# Patient Record
Sex: MALE | Race: WHITE | NOT HISPANIC OR LATINO | Employment: OTHER | URBAN - METROPOLITAN AREA
[De-identification: names, ages, dates, MRNs, and addresses within clinical notes are randomized per-mention and may not be internally consistent; named-entity substitution may affect disease eponyms.]

---

## 2018-11-07 PROBLEM — R10.13 EPIGASTRIC PAIN: Status: ACTIVE | Noted: 2018-11-07

## 2019-02-17 PROBLEM — R11.2 NAUSEA & VOMITING: Status: ACTIVE | Noted: 2019-02-17

## 2019-02-17 PROBLEM — K52.9 COLITIS: Status: ACTIVE | Noted: 2019-02-17

## 2019-02-17 PROBLEM — K92.0 HEMATEMESIS: Status: ACTIVE | Noted: 2019-02-17

## 2019-02-17 PROBLEM — I10 PRIMARY HYPERTENSION: Status: ACTIVE | Noted: 2019-02-17

## 2019-02-17 PROBLEM — E87.8 ELECTROLYTE ABNORMALITY: Status: ACTIVE | Noted: 2019-02-17

## 2019-02-17 PROBLEM — Z79.4 TYPE 2 DIABETES MELLITUS WITH KIDNEY COMPLICATION, WITH LONG-TERM CURRENT USE OF INSULIN: Status: ACTIVE | Noted: 2019-02-17

## 2019-02-17 PROBLEM — E78.5 DYSLIPIDEMIA: Status: ACTIVE | Noted: 2019-02-17

## 2019-02-17 PROBLEM — E11.29 TYPE 2 DIABETES MELLITUS WITH KIDNEY COMPLICATION, WITH LONG-TERM CURRENT USE OF INSULIN: Status: ACTIVE | Noted: 2019-02-17

## 2019-02-17 PROBLEM — N18.6 ESRD (END STAGE RENAL DISEASE): Status: ACTIVE | Noted: 2019-02-17

## 2019-02-19 PROBLEM — D69.6 THROMBOCYTOPENIA: Status: ACTIVE | Noted: 2019-02-19

## 2022-11-30 PROBLEM — R79.89 ELEVATED TROPONIN: Status: ACTIVE | Noted: 2022-11-30

## 2022-11-30 PROBLEM — K76.9 CHRONIC LIVER DISEASE: Chronic | Status: ACTIVE | Noted: 2022-11-30

## 2022-11-30 PROBLEM — K63.5 COLON POLYPS: Status: ACTIVE | Noted: 2022-11-30

## 2022-11-30 PROBLEM — R00.0 SINUS TACHYCARDIA: Status: ACTIVE | Noted: 2022-11-30

## 2022-11-30 PROBLEM — D50.9 IRON DEFICIENCY ANEMIA: Status: ACTIVE | Noted: 2022-11-30

## 2022-11-30 PROBLEM — D62 ACUTE BLOOD LOSS ANEMIA: Status: ACTIVE | Noted: 2022-11-30

## 2022-11-30 PROBLEM — Z71.89 ADVANCED CARE PLANNING/COUNSELING DISCUSSION: Status: ACTIVE | Noted: 2022-11-30
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
## 2023-11-04 PROBLEM — S06.6X0A SUBARACHNOID HEMORRHAGE FOLLOWING INJURY, NO LOSS OF CONSCIOUSNESS: Status: ACTIVE | Noted: 2023-11-04

## 2023-11-05 PROBLEM — I25.10 CORONARY ARTERY DISEASE INVOLVING NATIVE CORONARY ARTERY OF NATIVE HEART: Status: ACTIVE | Noted: 2023-11-05

## 2023-11-05 PROBLEM — Z79.891 CHRONIC PRESCRIPTION OPIATE USE: Chronic | Status: ACTIVE | Noted: 2023-11-05

## 2023-11-05 PROBLEM — K21.9 GASTROESOPHAGEAL REFLUX DISEASE WITHOUT ESOPHAGITIS: Status: ACTIVE | Noted: 2023-11-05

## 2023-11-05 PROBLEM — G62.9 NEUROPATHY: Chronic | Status: ACTIVE | Noted: 2023-11-05

## 2023-11-05 PROBLEM — S06.30AA TRAUMATIC INTRAVENTRICULAR HEMORRHAGE: Status: ACTIVE | Noted: 2023-11-05

## 2023-11-05 PROBLEM — J38.3 VOCAL CORD DYSFUNCTION: Status: ACTIVE | Noted: 2023-11-05

## 2023-11-05 PROBLEM — S06.30AA TRAUMATIC INTRAVENTRICULAR HEMORRHAGE: Status: RESOLVED | Noted: 2023-11-05 | Resolved: 2023-11-05

## 2023-11-06 PROBLEM — E87.8 ELECTROLYTE DISTURBANCE: Status: RESOLVED | Noted: 2019-02-17 | Resolved: 2023-11-06

## 2023-11-07 ENCOUNTER — TELEPHONE (OUTPATIENT)
Dept: NEUROSURGERY | Facility: CLINIC | Age: 63
End: 2023-11-07
Payer: MEDICARE

## 2023-11-07 DIAGNOSIS — I62.00 NONTRAUMATIC SUBDURAL HEMORRHAGE, UNSPECIFIED: Primary | ICD-10-CM

## 2023-11-07 PROBLEM — M89.8X9 RADIOLUCENT LESION OF BONE: Status: ACTIVE | Noted: 2023-11-07

## 2023-11-07 PROBLEM — E78.5 DYSLIPIDEMIA: Status: RESOLVED | Noted: 2019-02-17 | Resolved: 2023-11-07

## 2023-11-07 NOTE — TELEPHONE ENCOUNTER
----- Message from Nika Metcalf NP sent at 11/7/2023 12:47 PM CST -----  Regarding: Who does Vanessa follow ups?  Dr. EDWARDS would like 1 week follow up with head CT for this harsha who fell and had a head bleed.   Thank you

## 2023-12-08 PROBLEM — N18.5 ANEMIA, CHRONIC RENAL FAILURE, STAGE 5: Status: ACTIVE | Noted: 2023-12-08

## 2023-12-08 PROBLEM — D63.1 ANEMIA, CHRONIC RENAL FAILURE, STAGE 5: Status: ACTIVE | Noted: 2023-12-08

## 2023-12-08 PROBLEM — J81.1 PULMONARY EDEMA: Status: ACTIVE | Noted: 2023-12-08

## 2023-12-09 PROBLEM — F41.1 GENERALIZED ANXIETY DISORDER: Status: ACTIVE | Noted: 2023-12-09

## 2023-12-10 PROBLEM — J81.0 ACUTE PULMONARY EDEMA: Status: ACTIVE | Noted: 2023-12-08

## 2023-12-10 PROBLEM — J96.11 CHRONIC RESPIRATORY FAILURE WITH HYPOXIA: Status: ACTIVE | Noted: 2023-12-10

## 2023-12-19 ENCOUNTER — OFFICE VISIT (OUTPATIENT)
Dept: NEUROSURGERY | Facility: CLINIC | Age: 63
End: 2023-12-19
Payer: MEDICARE

## 2023-12-19 VITALS
SYSTOLIC BLOOD PRESSURE: 169 MMHG | RESPIRATION RATE: 18 BRPM | WEIGHT: 200 LBS | HEART RATE: 68 BPM | DIASTOLIC BLOOD PRESSURE: 72 MMHG | BODY MASS INDEX: 28.63 KG/M2 | HEIGHT: 70 IN

## 2023-12-19 DIAGNOSIS — I62.00 NONTRAUMATIC SUBDURAL HEMORRHAGE, UNSPECIFIED: Primary | ICD-10-CM

## 2023-12-19 PROCEDURE — 99214 OFFICE O/P EST MOD 30 MIN: CPT | Mod: S$PBB,,, | Performed by: NEUROLOGICAL SURGERY

## 2023-12-19 PROCEDURE — 99214 PR OFFICE/OUTPT VISIT, EST, LEVL IV, 30-39 MIN: ICD-10-PCS | Mod: S$PBB,,, | Performed by: NEUROLOGICAL SURGERY

## 2023-12-19 NOTE — PROGRESS NOTES
Neurosurgery History and Physical    Patient ID: Abhishek Parker is a 63 y.o. male.    Chief Complaint   Patient presents with    Follow-up     Patient present to clinic today as image review        Patient is a 63 year old male who presents today for a hospital follow up from 11/5/23 regarding his intraventricular hemorrhage and left frontal cortical contusion.  He has been staying in a nursing home in [place]. Prior to his fall, he lived with his brother. He reports the left foot is weak and feels like it is getting worse. He is having pain in both of his feet and entire legs. He does report some back pain as well. He has tingling in the feet ongoing for about 10 years with diabetic neuropathy. He does walk with a walker at the nursing home.     He denies headaches. He has no bladder incontinence, but doesn't produce much urine due to his ESRD. He has dialysis three times per week.     Review of Systems   Musculoskeletal:  Positive for back pain and gait problem.   Neurological:  Positive for weakness and numbness. Negative for headaches.   All other systems reviewed and are negative.      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    LEFT SIDE     Social History     Socioeconomic History    Marital status: Single   Tobacco Use    Smoking status: Former     Current packs/day: 0.00     Types: Cigarettes     Quit date: 9/1/2008     Years since quitting: 15.3    Smokeless tobacco: Never   Substance and Sexual Activity    Alcohol use: No    Drug use: No     Social Determinants of Health     Financial Resource Strain: Low Risk  (12/11/2023)    Overall Financial Resource Strain (CARDIA)     Difficulty of Paying Living Expenses: Not hard at all   Food Insecurity: No Food Insecurity  (12/11/2023)    Hunger Vital Sign     Worried About Running Out of Food in the Last Year: Never true     Ran Out of Food in the Last Year: Never true   Transportation Needs: No Transportation Needs (12/11/2023)    PRAPARE - Transportation     Lack of Transportation (Medical): No     Lack of Transportation (Non-Medical): No   Physical Activity: Unknown (11/6/2023)    Exercise Vital Sign     Days of Exercise per Week: 0 days   Housing Stability: Low Risk  (12/11/2023)    Housing Stability Vital Sign     Unable to Pay for Housing in the Last Year: No     Number of Places Lived in the Last Year: 1     Unstable Housing in the Last Year: No     Family History   Problem Relation Age of Onset    Lupus Father     Cancer Sister     Cancer Sister      Review of patient's allergies indicates:   Allergen Reactions    Neurontin [gabapentin] Other (See Comments)       Current Outpatient Medications:     allopurinol (ZYLOPRIM) 100 MG tablet, Take 100 mg by mouth once daily., Disp: , Rfl:     amLODIPine (NORVASC) 10 MG tablet, Take 10 mg by mouth once daily., Disp: , Rfl:     aspirin 81 MG Chew, Take 1 tablet (81 mg total) by mouth once daily., Disp: 30 tablet, Rfl: 0    blood sugar diagnostic Strp, To check BG weekly, to use with insurance preferred meter. Please provide a meter and supplies that patient's insurance will cover. Thank You.  E11.65 (Patient taking differently: 1 strip by In Vitro route 4 (four) times daily before meals and nightly.), Disp: 100 strip, Rfl: 1    clonazePAM (KLONOPIN) 0.5 MG tablet, Take 1 tablet (0.5 mg total) by mouth 2 (two) times daily., Disp: 8 tablet, Rfl: 0    clopidogreL (PLAVIX) 75 mg tablet, Take 1 tablet (75 mg total) by mouth once daily., Disp: 30 tablet, Rfl: 11    diazePAM (VALIUM) 5 MG tablet, Take 1 tablet (5 mg total) by mouth daily as needed for Anxiety (PANIC ATTACK AND 30 MIN PRIOR TO DIALYSIS)., Disp: 4 tablet, Rfl: 0    folic acid/vit B complex and C (AUSTYN-ULICES ORAL), Take 1 tablet  by mouth once daily., Disp: , Rfl:     hydrALAZINE (APRESOLINE) 25 MG tablet, Take 25 mg by mouth once daily., Disp: , Rfl:     HYDROcodone-acetaminophen (NORCO)  mg per tablet, Take 1 tablet by mouth every 6 (six) hours as needed for Pain., Disp: 12 tablet, Rfl: 0    hydrOXYzine pamoate (VISTARIL) 25 MG Cap, Take 25 mg by mouth every 6 (six) hours as needed (anxiety)., Disp: , Rfl:     insulin (LANTUS SOLOSTAR U-100 INSULIN) glargine 100 units/mL SubQ pen, Inject 10 Units into the skin daily as needed (for blood glucose >170)., Disp: , Rfl:     lancets Misc, To check BG weekly, to use with insurance preferred meter. Please provide a meter and supplies that patient's insurance will cover. Thank You.  E11.65 (Patient taking differently: 1 Lancet by Subdermal route 4 (four) times daily with meals and nightly.), Disp: 100 each, Rfl: 1    levothyroxine (SYNTHROID) 100 MCG tablet, Take 100 mcg by mouth before breakfast., Disp: , Rfl:     losartan (COZAAR) 100 MG tablet, Take 1 tablet (100 mg total) by mouth once daily., Disp: 90 tablet, Rfl: 3    metoprolol succinate (TOPROL-XL) 50 MG 24 hr tablet, Take 50 mg by mouth once daily., Disp: , Rfl:     pantoprazole (PROTONIX) 40 MG tablet, Take 1 tablet (40 mg total) by mouth 2 (two) times daily., Disp: 60 tablet, Rfl: 0    rosuvastatin (CRESTOR) 10 MG tablet, Take 10 mg by mouth once daily., Disp: , Rfl:     sertraline (ZOLOFT) 25 MG tablet, Take 1 tablet (25 mg total) by mouth once daily., Disp: 30 tablet, Rfl: 1    sevelamer carbonate (RENVELA) 800 mg Tab, Take 800 mg by mouth 3 (three) times daily with meals., Disp: , Rfl:     trazodone (DESYREL) 25 MG tablet, Take 25 mg by mouth every evening., Disp: , Rfl:     UNABLE TO FIND, Take 1 Cartridge by mouth once daily. medication name: nephro - said 1 carton daily to end stage renal disease, Disp: , Rfl:     blood-glucose meter kit, To check BG weekly, to use with insurance preferred meter. Please provide a meter and  "supplies that patient's insurance will cover. Thank You.  E11.65, Disp: 1 each, Rfl: 1  No current facility-administered medications for this visit.    Facility-Administered Medications Ordered in Other Visits:     0.9%  NaCl infusion, , Intravenous, Continuous, Nitish Mcmillan MD    aspirin tablet 325 mg, 325 mg, Oral, On Call Procedure, Owen Santiago MD, 325 mg at 08/18/23 1002    ondansetron injection 4 mg, 4 mg, Intravenous, Daily PRN, Volpi-Abadie, Jacqueline, MD    sodium chloride 0.9% flush 3 mL, 3 mL, Intravenous, PRN, Volpi-Abadie, Jacqueline, MD  Blood pressure (!) 169/72, pulse 68, resp. rate 18, height 5' 10" (1.778 m), weight 90.7 kg (200 lb).      Neurologic Exam     Mental Status   Oriented to person, place, and time.   Speech: speech is normal   Level of consciousness: alert  Knowledge: good.     Cranial Nerves     CN III, IV, VI   Extraocular motions are normal.     CN VII   Facial expression full, symmetric.     CN VIII   Hearing: intact    Motor Exam   Right arm pronator drift: absent  Left arm pronator drift: absent    Strength   Right deltoid: 5/5  Left deltoid: 5/5  Right biceps: 5/5  Left biceps: 5/5  Right triceps: 5/5  Left triceps: 5/5  Right wrist flexion: 5/5  Left wrist flexion: 5/5  Right wrist extension: 5/5  Left wrist extension: 5/5  Right interossei: 5/5  Left interossei: 5/5  Right iliopsoas: 5/5  Left iliopsoas: 4/5  Right quadriceps: 5/5  Left quadriceps: 5/5  Right anterior tibial: 5/5  Right posterior tibial: 5/5  Right peroneal: 5/5  Right gastroc: 5/5  Left dorsiflexion: 3/5  Left plantarflexion: 5/5  Left EHL: 0/5     Sensory Exam   Right arm light touch: normal  Left arm light touch: normal  Right leg light touch: decreased from ankle  Left leg light touch: decreased from ankle    Gait, Coordination, and Reflexes     Coordination   Finger to nose coordination: normal    Reflexes   Right biceps: 2+  Left biceps: 2+  Right patellar: 0  Left patellar: 0  Right " "achilles: 0  Left achilles: 0  Right Goyal: absent  Left Goyal: absent  Presents in wheelchair        Physical Exam  Vitals and nursing note reviewed.   Eyes:      Extraocular Movements: EOM normal.   Neurological:      Mental Status: He is oriented to person, place, and time.      Coordination: Finger-Nose-Finger Test normal.      Deep Tendon Reflexes:      Reflex Scores:       Bicep reflexes are 2+ on the right side and 2+ on the left side.       Patellar reflexes are 0 on the right side and 0 on the left side.       Achilles reflexes are 0 on the right side and 0 on the left side.  Psychiatric:         Speech: Speech normal.         Vital Signs  Pulse: 68  Resp: 18  BP: (!) 169/72  BP Location: Right arm  Patient Position: Sitting  Pain Score:   6  Pain Loc: Leg  Height and Weight  Height: 5' 10" (177.8 cm)  Weight: 90.7 kg (200 lb)  BSA (Calculated - sq m): 2.12 sq meters  BMI (Calculated): 28.7  Weight in (lb) to have BMI = 25: 173.9]    Provider dictation:  CT head dated 12/8/23 reveals no acute intracranial abnormalities and no new bleeding.     He is cleared to continue his Aspirin as needed for his cardiac stents from a neurosurgery standpoint. Continue with PT at the facility. We do not need to repeat any scans and he may follow up prn.     Visit Diagnosis:  Nontraumatic subdural hemorrhage, unspecified        "

## 2024-02-13 PROBLEM — E03.9 HYPOTHYROID: Status: ACTIVE | Noted: 2024-02-13

## 2024-02-13 PROBLEM — E87.6 HYPOKALEMIA: Status: ACTIVE | Noted: 2024-02-13

## 2024-02-13 PROBLEM — D63.8 ANEMIA OF CHRONIC DISEASE: Status: ACTIVE | Noted: 2024-02-13

## 2024-02-13 PROBLEM — S06.350A TRAUMATIC LEFT-SIDED INTRACEREBRAL HEMORRHAGE WITHOUT LOSS OF CONSCIOUSNESS: Status: ACTIVE | Noted: 2024-02-13

## 2024-02-14 ENCOUNTER — TELEPHONE (OUTPATIENT)
Dept: NEUROSURGERY | Facility: CLINIC | Age: 64
End: 2024-02-14
Payer: MEDICARE

## 2024-02-14 DIAGNOSIS — I62.00 NONTRAUMATIC SUBDURAL HEMORRHAGE, UNSPECIFIED: Primary | ICD-10-CM

## 2024-02-14 PROBLEM — R78.81 BACTEREMIA: Status: ACTIVE | Noted: 2024-02-14

## 2024-02-14 PROBLEM — R29.6 FREQUENT FALLS: Chronic | Status: ACTIVE | Noted: 2024-02-14

## 2024-02-14 NOTE — TELEPHONE ENCOUNTER
----- Message from Nika Metcalf NP sent at 2/14/2024 12:40 PM CST -----  Regarding: follow up per Dr. Gallegos  Patient known to Dr. Gallegos, would like for him to follow up with repeat head CT in 2 weeks with [facility] for routine follow up for Small trace SA[facility].   Thanks-E

## 2024-02-15 PROBLEM — I33.0 ACUTE BACTERIAL ENDOCARDITIS: Status: ACTIVE | Noted: 2024-02-15

## 2024-02-18 PROBLEM — K74.60 CIRRHOSIS: Status: ACTIVE | Noted: 2022-11-30

## 2024-02-19 PROBLEM — Z99.2 ESRD ON HEMODIALYSIS: Status: ACTIVE | Noted: 2019-02-17

## 2024-02-20 ENCOUNTER — TELEPHONE (OUTPATIENT)
Dept: INFECTIOUS DISEASES | Facility: CLINIC | Age: 64
End: 2024-02-20
Payer: MEDICARE

## 2024-02-20 NOTE — TELEPHONE ENCOUNTER
----- Message from Liza Santos sent at 2/20/2024  3:17 PM CST -----  Type:  Sooner Appointment Request    Caller is requesting a sooner appointment.  Caller declined first available appointment listed below.  Caller will not accept being placed on the waitlist and is requesting a message be sent to doctor.    Name of Caller:  Danya with [facility]  When is the first available appointment?  na  Symptoms:  hospital f/u- 1 week  Would the patient rather a call back or a response via [facility]? call  Best Call Back Number:  [phone] (home) [phone]    Additional Information:

## 2024-02-20 NOTE — TELEPHONE ENCOUNTER
Returned call, patient is currently being discharged, nurse was in the room giving d/c instructions, spoke with nurse, ID f/u date/time/location is on the d/c printout and hilighted for patient.

## 2024-02-29 ENCOUNTER — TELEPHONE (OUTPATIENT)
Dept: NEUROSURGERY | Facility: CLINIC | Age: 64
End: 2024-02-29
Payer: MEDICARE

## 2024-02-29 NOTE — TELEPHONE ENCOUNTER
----- Message from Henry Mendes sent at 2/29/2024 10:31 AM CST -----  Type: Needs Medical Advice  Who Called:  Se Arellano Call Back Number: [phone]  Additional Information: pt has an appt tomorrow that's needs to be r/s, pl call bk to advise thank

## 2024-03-01 ENCOUNTER — OFFICE VISIT (OUTPATIENT)
Dept: NEUROSURGERY | Facility: CLINIC | Age: 64
End: 2024-03-01
Payer: MEDICARE

## 2024-03-01 ENCOUNTER — HOSPITAL ENCOUNTER (OUTPATIENT)
Dept: RADIOLOGY | Facility: HOSPITAL | Age: 64
Discharge: HOME | End: 2024-03-01
Attending: NEUROLOGICAL SURGERY
Payer: MEDICARE

## 2024-03-01 VITALS
SYSTOLIC BLOOD PRESSURE: 132 MMHG | HEIGHT: 70 IN | DIASTOLIC BLOOD PRESSURE: 61 MMHG | BODY MASS INDEX: 26.92 KG/M2 | RESPIRATION RATE: 18 BRPM | WEIGHT: 188.06 LBS | HEART RATE: 71 BPM

## 2024-03-01 DIAGNOSIS — S06.6X0D SUBARACHNOID HEMORRHAGE FOLLOWING INJURY, NO LOSS OF CONSCIOUSNESS, SUBSEQUENT ENCOUNTER: Primary | ICD-10-CM

## 2024-03-01 DIAGNOSIS — I62.00 NONTRAUMATIC SUBDURAL HEMORRHAGE, UNSPECIFIED: ICD-10-CM

## 2024-03-01 PROCEDURE — 70450 CT HEAD/BRAIN W/O DYE: CPT | Mod: TC,PO

## 2024-03-01 PROCEDURE — 70450 CT HEAD/BRAIN W/O DYE: CPT | Mod: 26,,, | Performed by: RADIOLOGY

## 2024-03-01 PROCEDURE — 99213 OFFICE O/P EST LOW 20 MIN: CPT | Mod: S$PBB,,, | Performed by: PHYSICIAN ASSISTANT

## 2024-03-01 NOTE — PROGRESS NOTES
Neurosurgery History and Physical    Patient ID: Abhishek Parker is a 63 y.o. male.    Chief Complaint   Patient presents with    Follow-up     2 week f/u with imaging       HPI 3/1/24:  Patient is a 63 year old male with complex medical history who presents as a hospital follow up for his left para falcine subarachnoid hemorrhage. He feels back to normal. He did have another fall on Saturday, 6 days ago. He was leaning over to plug in a heater and fell backwards striking the right side of his head on the bed or the nightstand. He was found down by his brother. Patient refused medical care at that time. He did complete his CT scan today. He has been holding his ASA and Plavix, has multiple coronary stents. He denies headache, vision changes, new weakness, numbness, confusion or any other concerning symptoms.     Review of Systems   Eyes:  Negative for photophobia and visual disturbance.   Musculoskeletal:  Positive for back pain and gait problem.   Neurological:  Negative for dizziness, tremors, syncope, facial asymmetry, speech difficulty, light-headedness and headaches.   Psychiatric/Behavioral:  Negative for confusion.    All other systems reviewed and are negative.      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    LEFT SIDE     Social History     Socioeconomic History    Marital status: Single   Tobacco Use    Smoking status: Former     Current packs/day: 0.00     Types: Cigarettes     Quit date: 9/1/2008     Years since quitting: 15.5    Smokeless tobacco: Never   Substance and Sexual Activity    Alcohol use: No    Drug use: No     Social Determinants of Health     Financial Resource Strain: Low Risk  (2/14/2024)    Overall Financial Resource Strain (CARDIA)     Difficulty of Paying  Living Expenses: Not hard at all   Food Insecurity: No Food Insecurity (2/14/2024)    Hunger Vital Sign     Worried About Running Out of Food in the Last Year: Never true     Ran Out of Food in the Last Year: Never true   Transportation Needs: No Transportation Needs (2/14/2024)    PRAPARE - Transportation     Lack of Transportation (Medical): No     Lack of Transportation (Non-Medical): No   Physical Activity: Unknown (11/6/2023)    Exercise Vital Sign     Days of Exercise per Week: 0 days   Housing Stability: Low Risk  (2/14/2024)    Housing Stability Vital Sign     Unable to Pay for Housing in the Last Year: No     Number of Places Lived in the Last Year: 1     Unstable Housing in the Last Year: No     Family History   Problem Relation Age of Onset    Lupus Father     Cancer Sister     Cancer Sister      Review of patient's allergies indicates:   Allergen Reactions    Hydrocodone     Neurontin [gabapentin] Other (See Comments)       Current Outpatient Medications:     allopurinol (ZYLOPRIM) 100 MG tablet, Take 100 mg by mouth after lunch., Disp: , Rfl:     amLODIPine (NORVASC) 10 MG tablet, Take 10 mg by mouth after lunch., Disp: , Rfl:     benzonatate (TESSALON) 100 MG capsule, Take 1 capsule (100 mg total) by mouth 3 (three) times daily as needed for Cough., Disp: 30 capsule, Rfl: 0    blood sugar diagnostic Strp, To check BG weekly, to use with insurance preferred meter. Please provide a meter and supplies that patient's insurance will cover. Thank You.  E11.65 (Patient taking differently: 1 strip by In Vitro route 2 (two) times a day.), Disp: 100 strip, Rfl: 1    calcitRIOL (ROCALTROL) 0.25 MCG Cap, Take 1 capsule (0.25 mcg total) by mouth 3 (three) times a week., Disp: 12 capsule, Rfl: 0    clonazePAM (KLONOPIN) 0.5 MG tablet, Take 0.5 mg by mouth nightly as needed for Anxiety., Disp: , Rfl:     clonazePAM (KLONOPIN) 0.5 MG tablet, Take 1 tablet (0.5 mg total) by mouth once daily., Disp: , Rfl:     dextrose  5 % (D5W) SolP 100 mL with ceftAZIDime 1 gram SolR 1 g, Inject 1 g into the vein every Mon, Wed, Fri. After Hemodialysis every MWF, Disp: , Rfl:     diazePAM (VALIUM) 5 MG tablet, Take 1 tablet (5 mg total) by mouth daily as needed for Anxiety (PANIC ATTACK AND 30 MIN PRIOR TO DIALYSIS)., Disp: 4 tablet, Rfl: 0    hydrALAZINE (APRESOLINE) 25 MG tablet, Take 25 mg by mouth 2 (two) times a day., Disp: , Rfl:     hydrOXYzine pamoate (VISTARIL) 25 MG Cap, Take 25 mg by mouth every 6 (six) hours as needed (anxiety)., Disp: , Rfl:     insulin (LANTUS SOLOSTAR U-100 INSULIN) glargine 100 units/mL SubQ pen, Inject 10 Units into the skin daily as needed (for blood glucose >170)., Disp: , Rfl:     lancets Misc, To check BG weekly, to use with insurance preferred meter. Please provide a meter and supplies that patient's insurance will cover. Thank You.  E11.65 (Patient taking differently: 1 Lancet by Subdermal route 2 (two) times a day.), Disp: 100 each, Rfl: 1    levothyroxine (SYNTHROID) 100 MCG tablet, Take 100 mcg by mouth with lunch., Disp: , Rfl:     metoprolol succinate (TOPROL-XL) 50 MG 24 hr tablet, Take 50 mg by mouth after lunch., Disp: , Rfl:     pantoprazole (PROTONIX) 40 MG tablet, Take 1 tablet (40 mg total) by mouth 2 (two) times daily., Disp: 60 tablet, Rfl: 0    pregabalin (LYRICA) 100 MG capsule, Take 100 mg by mouth 2 (two) times daily., Disp: , Rfl:     rosuvastatin (CRESTOR) 10 MG tablet, Take 10 mg by mouth after lunch., Disp: , Rfl:     sertraline (ZOLOFT) 25 MG tablet, Take 1 tablet (25 mg total) by mouth once daily. (Patient taking differently: Take 25 mg by mouth after lunch.), Disp: 30 tablet, Rfl: 1    vancomycin HCl (VANCOMYCIN 1 G/250 ML D5W, READY TO MIX,), 750mg IV every Mon, Wed and Friday after HD. Pharmacy to dose. Indication is bacteremia., Disp: , Rfl:   No current facility-administered medications for this visit.    Facility-Administered Medications Ordered in Other Visits:     0.9%  NaCl  "infusion, , Intravenous, Continuous, Deyanira, Nitish BRO MD    aspirin tablet 325 mg, 325 mg, Oral, On Call Procedure, Owen Santiago MD, 325 mg at 08/18/23 1002    ondansetron injection 4 mg, 4 mg, Intravenous, Daily PRN, Volpi-Abadie, Jacqueline, MD    sodium chloride 0.9% flush 3 mL, 3 mL, Intravenous, PRN, Volpi-Abadie, Jacqueline, MD  Blood pressure 132/61, pulse 71, resp. rate 18, height 5' 10" (1.778 m), weight 85.3 kg (188 lb 0.8 oz).      Neurologic Exam     Mental Status   Oriented to person, place, and time.   Attention: normal. Concentration: normal.   Speech: speech is normal   Level of consciousness: alert  Knowledge: good.     Cranial Nerves     CN III, IV, VI   Extraocular motions are normal.     CN V   Facial sensation intact.     CN VII   Facial expression full, symmetric.     CN VIII   Hearing: intact    CN XI   Right trapezius strength: normal  Left trapezius strength: normal    CN XII   Tongue: not atrophic  Fasciculations: absent  Tongue deviation: none    Motor Exam   Right arm pronator drift: absent  Left arm pronator drift: absent    Strength   Right deltoid: 5/5  Left deltoid: 5/5  Right biceps: 5/5  Left biceps: 5/5  Right triceps: 5/5  Left triceps: 5/5  Right interossei: 5/5  Left interossei: 5/5  Right iliopsoas: 5/5  Left iliopsoas: 5/5  Right quadriceps: 5/5  Left quadriceps: 5/5  Right peroneal: 5/5  Left peroneal: 5/5  Right gastroc: 5/5  Left gastroc: 5/5    Sensory Exam   Light touch normal.     Gait, Coordination, and Reflexes     Coordination   Finger to nose coordination: normalWheelchair        Physical Exam  Vitals and nursing note reviewed.   Eyes:      Extraocular Movements: EOM normal.   Neurological:      Mental Status: He is oriented to person, place, and time.      Coordination: Finger-Nose-Finger Test normal.   Psychiatric:         Speech: Speech normal.         Vital Signs  Pulse: 71  Resp: 18  BP: 132/61  BP Location: Right arm  Patient Position: Sitting  Pain " "Score:   6  Pain Loc: Head  Height and Weight  Height: 5' 10" (177.8 cm)  Weight: 85.3 kg (188 lb 0.8 oz)  BSA (Calculated - sq m): 2.05 sq meters  BMI (Calculated): 27  Weight in (lb) to have BMI = 25: 173.9]    Provider dictation:  CT head dated 3/1/24 is personally reviewed and compared to scan from 2/14/24. There is resolution of subarachnoid blood without acute bleeding.     Patient may restart his anticoagulants as he is high risk with his stents. Will discuss with Dr. Mendez on need for repeat imaging and will order if appropriate. If not, patient may follow up as needed.      ER precautions were discussed. Stressed importance of seeking medical advice if he has another fall, especially in the setting of Plavix and ASA. He voiced understanding of fall and with any new confusion, weakness, new numbness, severe headache, changes in vision.     Visit Diagnosis:  Subarachnoid hemorrhage following injury, no loss of consciousness, subsequent encounter        "

## 2024-03-07 ENCOUNTER — TELEPHONE (OUTPATIENT)
Dept: INFECTIOUS DISEASES | Facility: CLINIC | Age: 64
End: 2024-03-07
Payer: MEDICARE

## 2024-03-07 DIAGNOSIS — I33.0 ACUTE BACTERIAL ENDOCARDITIS: Primary | ICD-10-CM

## 2024-03-07 NOTE — TELEPHONE ENCOUNTER
Per CECE Colin - called [facility] in [place], spoke with Leslie, faxed orders for cbc and Vanc trough to be drawn tomorrow and results faxed to [facility]. They do not draw blood cultures, therefore, will keep follow up on 4/4/24 in clinic and have drawn at that time.     Faxed orders to [phone]

## 2024-03-11 PROBLEM — J81.0 ACUTE PULMONARY EDEMA: Status: RESOLVED | Noted: 2023-12-08 | Resolved: 2024-03-11

## 2024-04-04 ENCOUNTER — HOSPITAL ENCOUNTER (OUTPATIENT)
Dept: RADIOLOGY | Facility: HOSPITAL | Age: 64
Discharge: HOME | End: 2024-04-04
Attending: PHYSICIAN ASSISTANT
Payer: MEDICARE

## 2024-04-04 ENCOUNTER — OFFICE VISIT (OUTPATIENT)
Dept: INFECTIOUS DISEASES | Facility: CLINIC | Age: 64
End: 2024-04-04
Payer: MEDICARE

## 2024-04-04 VITALS — WEIGHT: 188.06 LBS | HEIGHT: 70 IN | BODY MASS INDEX: 26.92 KG/M2

## 2024-04-04 DIAGNOSIS — Z99.2 ESRD ON HEMODIALYSIS: ICD-10-CM

## 2024-04-04 DIAGNOSIS — R05.3 CHRONIC COUGH: ICD-10-CM

## 2024-04-04 DIAGNOSIS — N18.6 ESRD ON HEMODIALYSIS: ICD-10-CM

## 2024-04-04 DIAGNOSIS — I33.0 ACUTE BACTERIAL ENDOCARDITIS: Primary | ICD-10-CM

## 2024-04-04 PROCEDURE — 99214 OFFICE O/P EST MOD 30 MIN: CPT | Mod: S$PBB,,, | Performed by: PHYSICIAN ASSISTANT

## 2024-04-04 PROCEDURE — 99214 OFFICE O/P EST MOD 30 MIN: CPT | Mod: PBBFAC,25,PO | Performed by: PHYSICIAN ASSISTANT

## 2024-04-04 PROCEDURE — 71046 X-RAY EXAM CHEST 2 VIEWS: CPT | Mod: 26,,, | Performed by: RADIOLOGY

## 2024-04-04 PROCEDURE — 99999 PR PBB SHADOW E&M-EST. PATIENT-LVL IV: CPT | Mod: PBBFAC,,, | Performed by: PHYSICIAN ASSISTANT

## 2024-04-04 PROCEDURE — 71046 X-RAY EXAM CHEST 2 VIEWS: CPT | Mod: TC,FY,PO

## 2024-04-04 RX ORDER — CEFTAZIDIME 1 G/1
INJECTION, POWDER, FOR SOLUTION INTRAMUSCULAR; INTRAVENOUS
Status: ON HOLD | COMMUNITY
Start: 2024-03-20 | End: 2024-04-10 | Stop reason: HOSPADM

## 2024-04-04 RX ORDER — LOSARTAN POTASSIUM 100 MG/1
100 TABLET ORAL DAILY
Status: ON HOLD | COMMUNITY
Start: 2024-03-25 | End: 2024-04-10 | Stop reason: HOSPADM

## 2024-04-05 NOTE — PROGRESS NOTES
[facility]  Infectious Disease        Patient ID: Abhishek Parker is a 63 y.o. male.    Chief Complaint: Follow-up and Blood Infection      Abhishek was seen today for follow-up and blood infection.    Diagnoses and all orders for this visit:    Acute bacterial endocarditis  -     CBC Auto Differential; Future  -     Basic Metabolic Panel; Future  -     Blood culture; Future    ESRD on hemodialysis    Chronic cough  -     X-Ray Chest PA And Lateral; Future         Greater than 45 minutes was spent on this encounter, which included: review of recent encounters, review and interpretation of labs/images, obtaining pertinent history, performing a physical examination, counseling and educating the patient/family/caregiver, ordering medications/tests, documenting in the electronic health record, and coordinating care with necessary providers.    Interval HPI:   4/4/24 - Patient here for ID clinic f/u, accompanied by his brother, Se. Most of the talking is done by Se due to patient's paralyzed vocal cord but he is AAO and participates in the conversation. They state he has overall not been doing very well. Did fine with the antibiotics and has not been having any fevers, but he has continued weakness and poor functional status and is extremely dependent on Se who is his caregiver. He cannot walk around the house on his own due to being unsteady on his feet. Se also expresses that Mr. Weiss is not always compliant with adhering to receommendations such as going to PT or doing home exercises, going to/making doctor's appts, certain activity restrictions (still tries to walk on his own), and avoiding thin liquids as recommend by speech therapy. Also has a chronic wet cough and thinks he is aspirating, along with LYONS.     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 faecalis   - 2/14: Bcx:  E. Faecalis  (R-Gent synergy in 1 colony)  - 2/14 Urine Cx: E. Faecalis  - 2/17 Bcx: NTGD  - 2/18 Bcx: NGTD  - TTE: Small vegetations present on both the aortic and mitral valves  - US Left arm: no abscess or collection identified in the area of the fistula  - US abdomen: Ascited, Enlarged liver with cirrhosis, marked splenomegaly, renal cortical thickening  - discharged home with 6 weeks of post-HD IV Vancomycin and Ceftazidime synergy (EOC 3/30/24) with plans for surveillance blood cx 1 week after     # Subarachnoid hemorrhage  - No mass effect  - Evaluated by neurosurgery.  Did not feel the patient progressed     # ESRD on HD    # Debility  - with decline in recent months  - family expresses difficulty caring for him     Recommendations:  - completed 6 weeks of post-HD IV Vancomycin and Ceftazidime synergy (EOC 3/30/24) for multivalvular endocarditis due to Enterococcus  - will check surveillance blood cultures today since original source for bacteremia unclear (?urine)  - patient also c/o cough and LYONS. Check Chest XRY and CBC today  - advised that he needs to have follow-up with PCP and Cardiology as his symptoms could be related to cardiac issues.   - additionally reviewed that patient needs to adhere to medical recommendations (PT compliance, going to/making doctor's appts, certain activity restrictions, avoiding thin liquids, etc), unless his goals of care are more in line with palliative/hospice type measures which should be discussed with his PCP. Also can review any resources available for him  - will call to discuss results but if not indicative of infection, he may f/u PRN       Daly Diaz PA-C  Infectious Diseases  [facility]         HPI:      This is a 63-year-old man with multiple comorbidities.  CAD, diabetes, ESRD on HD.    Patient was transferred from [facility] to this hospital on 02/13/2024.    Patient states that for the last 2 months he has been feeling very poorly.    He  states that during HD he feels drained possible chills.    Tells me that in the last few days he is feels so bad that he has sustained multiple ground level falls and syncopal episodes.    Patient was evaluated at [facility] and noted to have subdural hematoma reason for which patient was transferred to this hospital for further evaluation.           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    LEFT SIDE       Past Surgical History:   Procedure Laterality Date    ATHERECTOMY  10/13/2023    Procedure: VERONICA LAD;  Surgeon: Owen Santiago MD;  Location: [facility] CATH;  Service: Cardiology;;    CHOLECYSTECTOMY      COLONOSCOPY N/A 11/30/2022    Procedure: COLONOSCOPY;  Surgeon: Eamon Llanos MD;  Location: [facility] ENDO;  Service: Endoscopy;  Laterality: N/A;    CORONARY STENT PLACEMENT  10/13/2023    Procedure: VERONICA LCX;  Surgeon: Owen Santiago MD;  Location: [facility] CATH;  Service: Cardiology;;    DIALYSIS FISTULA CREATION  03/2018    ESOPHAGEAL DILATION N/A 11/7/2018    Procedure: DILATION, ESOPHAGUS;  Surgeon: SETH vIan MD;  Location: [facility] ENDO;  Service: Colon and Rectal;  Laterality: N/A;    ESOPHAGOGASTRODUODENOSCOPY N/A 11/7/2018    Procedure: EGD (ESOPHAGOGASTRODUODENOSCOPY);  Surgeon: SETH Ivan MD;  Location: [facility] ENDO;  Service: Colon and Rectal;  Laterality: N/A;    ESOPHAGOGASTRODUODENOSCOPY Left 2/18/2019    Procedure: EGD (ESOPHAGOGASTRODUODENOSCOPY);  Surgeon: Doug Huertas MD;  Location: [facility] ENDO;  Service: Endoscopy;  Laterality: Left;    ESOPHAGOGASTRODUODENOSCOPY N/A 11/30/2022    Procedure: EGD (ESOPHAGOGASTRODUODENOSCOPY);  Surgeon: Eamon Llanos MD;  Location: [facility];  Service: Endoscopy;  Laterality: N/A;    IVUS, CORONARY   10/13/2023    Procedure: IVUS LCX;  Surgeon: Owen Santiago MD;  Location: [facility] CATH;  Service: Cardiology;;    LEFT HEART CATHETERIZATION  8/18/2023    Procedure: Left heart cath;  Surgeon: Owen Santiago MD;  Location: [facility] CATH;  Service: Cardiology;;    LYMPH NODE BIOPSY      PERCUTANEOUS CORONARY INTERVENTION, ARTERY  10/13/2023    Procedure: Coronary angiogram study;  Surgeon: Owen Santiago MD;  Location: [facility] CATH;  Service: Cardiology;;    PERCUTANEOUS TRANSLUMINAL BALLOON ANGIOPLASTY OF CORONARY ARTERY  8/18/2023    Procedure: PTCA RCA;  Surgeon: Owen Santiago MD;  Location: [facility] CATH;  Service: Cardiology;;    STENT, DRUG ELUTING, MULTI VESSEL, CORONARY  10/13/2023    Procedure: VERONICA Diagonal;  Surgeon: Owen Santiago MD;  Location: [facility] CATH;  Service: Cardiology;;    STENT, DRUG ELUTING, SINGLE VESSEL, CORONARY  8/18/2023    Procedure: VERONICA RCA;  Surgeon: Owen Santiago MD;  Location: [facility] CATH;  Service: Cardiology;;       Family History   Problem Relation Age of Onset    Lupus Father     Cancer Sister     Cancer Sister        Social History     Socioeconomic History    Marital status: Single   Tobacco Use    Smoking status: Former     Current packs/day: 0.00     Types: Cigarettes     Quit date: 9/1/2008     Years since quitting: 15.6    Smokeless tobacco: Never   Substance and Sexual Activity    Alcohol use: No    Drug use: No     Social Determinants of Health     Financial Resource Strain: Low Risk  (2/14/2024)    Overall Financial Resource Strain (CARDIA)     Difficulty of Paying Living Expenses: Not hard at all   Food Insecurity: No Food Insecurity (2/14/2024)    Hunger Vital Sign     Worried About Running Out of Food in the Last Year: Never true     Ran Out of Food in the Last Year: Never true   Transportation Needs: No Transportation Needs (2/14/2024)    PRAPARE - Transportation     Lack of Transportation (Medical): No     Lack of Transportation (Non-Medical): No   Physical  Activity: Unknown (11/6/2023)    Exercise Vital Sign     Days of Exercise per Week: 0 days   Housing Stability: Low Risk  (2/14/2024)    Housing Stability Vital Sign     Unable to Pay for Housing in the Last Year: No     Number of Places Lived in the Last Year: 1     Unstable Housing in the Last Year: No       Review of patient's allergies indicates:   Allergen Reactions    Hydrocodone     Neurontin [gabapentin] Other (See Comments)       Current Outpatient Medications   Medication Instructions    allopurinoL (ZYLOPRIM) 100 mg, Oral, After lunch    amLODIPine (NORVASC) 10 mg, Oral, After lunch    blood sugar diagnostic Strp To check BG weekly, to use with insurance preferred meter. Please provide a meter and supplies that patient's insurance will cover. Thank You.  E11.65    ceftAZIDime (FORTAZ) 1 gram injection     clonazePAM (KLONOPIN) 0.5 mg, Oral, Nightly PRN    clonazePAM (KLONOPIN) 0.5 mg, Oral, Daily    diazePAM (VALIUM) 5 mg, Oral, Daily PRN    hydrALAZINE (APRESOLINE) 25 mg, Oral, 2 times daily    hydrOXYzine pamoate (VISTARIL) 25 mg, Oral, Every 6 hours PRN    insulin (LANTUS SOLOSTAR U-100 INSULIN) 10 Units, Subcutaneous, Daily PRN    lancets Misc To check BG weekly, to use with insurance preferred meter. Please provide a meter and supplies that patient's insurance will cover. Thank You.  E11.65    levothyroxine (SYNTHROID) 100 mcg, Oral, with lunch    losartan (COZAAR) 100 mg, Oral    metoprolol succinate (TOPROL-XL) 50 mg, Oral, After lunch    pantoprazole (PROTONIX) 40 mg, Oral, 2 times daily    pregabalin (LYRICA) 100 mg, Oral, 2 times daily    rosuvastatin (CRESTOR) 10 mg, Oral, After lunch    sertraline (ZOLOFT) 25 mg, Oral, Daily    vancomycin HCl (VANCOMYCIN 1 G/250 ML D5W, READY TO MIX,) 750mg IV every Mon, Wed and Friday after HD. Pharmacy to dose. Indication is bacteremia.         Review of Systems   Constitutional:  Positive for activity change. Negative for appetite change, chills, fatigue  "and fever.   HENT:  Positive for voice change (limited speech due to "paralyzed" vocal cord). Negative for congestion, mouth sores, sinus pain and sore throat.    Eyes:  Negative for photophobia and visual disturbance.   Respiratory:  Positive for cough and shortness of breath (LYONS). Negative for chest tightness and wheezing.    Cardiovascular:  Negative for chest pain, palpitations and leg swelling.   Gastrointestinal:  Negative for abdominal pain, diarrhea, nausea and vomiting.   Genitourinary:  Negative for dysuria, flank pain, hematuria and urgency.   Musculoskeletal:  Negative for arthralgias, myalgias, neck pain and neck stiffness.   Skin:  Positive for wound. Negative for color change and rash.   Allergic/Immunologic: Negative for immunocompromised state.   Neurological:  Negative for dizziness, seizures and headaches.   Psychiatric/Behavioral:  Negative for confusion.            Objective:     There were no vitals filed for this visit.           Physical Exam  Vitals and nursing note reviewed.   Constitutional:       General: He is awake. He is not in acute distress.     Appearance: He is well-developed and well-groomed. He is ill-appearing (chronically). He is not diaphoretic.      Comments: Appears older than stated age, weak, debilitated   HENT:      Head: Normocephalic and atraumatic.      Right Ear: External ear normal.      Left Ear: External ear normal.      Nose: Nose normal.      Mouth/Throat:      Comments: Limited speech  Eyes:      General: No scleral icterus.        Right eye: No discharge.         Left eye: No discharge.      Pupils: Pupils are equal, round, and reactive to light.   Cardiovascular:      Rate and Rhythm: Normal rate and regular rhythm.      Heart sounds: Normal heart sounds. No murmur heard.  Pulmonary:      Effort: Pulmonary effort is normal. No accessory muscle usage or respiratory distress.      Breath sounds: Wheezing and rales present.   Abdominal:      General: There is no " distension.   Musculoskeletal:      Cervical back: Normal range of motion and neck supple.      Right lower leg: Edema present.      Left lower leg: Edema present.   Lymphadenopathy:      Cervical: No cervical adenopathy.   Skin:     General: Skin is warm and dry.      Findings: Abrasion and bruising present.      Comments: Multiple superficial abrasions/bruises to BUEs   Neurological:      General: No focal deficit present.      Mental Status: He is alert and oriented to person, place, and time.   Psychiatric:         Mood and Affect: Mood normal.         Behavior: Behavior normal.           Estimated Creatinine Clearance: 14.9 mL/min (A) (based on SCr of 5.1 mg/dL (H)).      Microbiology Results (last 7 days)       ** No results found for the last 168 hours. **              Significant Labs: All pertinent labs within the past 24 hours have been reviewed.     Significant Imaging: I have reviewed all relevant and available imaging results/findings within the past 24 hours.      Plan -- see top of note

## 2024-04-06 PROBLEM — I48.91 ATRIAL FIBRILLATION WITH RVR: Status: ACTIVE | Noted: 2024-04-06

## 2024-04-08 PROBLEM — J44.1 COPD WITH ACUTE EXACERBATION: Status: ACTIVE | Noted: 2024-04-08

## 2024-04-15 ENCOUNTER — TELEPHONE (OUTPATIENT)
Dept: INFECTIOUS DISEASES | Facility: CLINIC | Age: 64
End: 2024-04-15
Payer: MEDICARE

## 2024-04-15 NOTE — TELEPHONE ENCOUNTER
Per Dr. Bosch, called pt's brother Se, gave results of Negative blood cultures. Se voiced understanding and appreciation for the call

## 2024-04-15 NOTE — TELEPHONE ENCOUNTER
----- Message from Daly Diaz PA-C sent at 4/12/2024  2:06 PM CDT -----  Please call patient's brother and inform that repeat blood cultures have finalized at negative.

## 2024-05-20 PROBLEM — J96.11 CHRONIC RESPIRATORY FAILURE WITH HYPOXIA: Status: RESOLVED | Noted: 2023-12-10 | Resolved: 2024-05-20

## 2025-05-15 PROBLEM — L97.519 BILATERAL DIABETIC FOOT ULCER ASSOCIATED WITH SECONDARY DIABETES MELLITUS: Status: ACTIVE | Noted: 2025-05-15

## 2025-05-15 PROBLEM — L97.529 BILATERAL DIABETIC FOOT ULCER ASSOCIATED WITH SECONDARY DIABETES MELLITUS: Status: ACTIVE | Noted: 2025-05-15

## 2025-05-15 PROBLEM — E08.621 BILATERAL DIABETIC FOOT ULCER ASSOCIATED WITH SECONDARY DIABETES MELLITUS: Status: ACTIVE | Noted: 2025-05-15

## 2025-05-16 PROBLEM — Z73.6 LIMITATION OF ACTIVITY DUE TO DISABILITY: Status: ACTIVE | Noted: 2025-05-16

## 2025-05-18 PROBLEM — E43 SEVERE MALNUTRITION: Status: ACTIVE | Noted: 2025-05-18

## 2025-05-18 PROBLEM — F11.10 NARCOTIC ABUSE: Status: ACTIVE | Noted: 2025-05-18

## 2025-05-24 PROBLEM — K22.11 ESOPHAGEAL ULCER WITH BLEEDING: Status: ACTIVE | Noted: 2025-05-24

## 2025-05-27 PROBLEM — K22.11 ESOPHAGEAL ULCER WITH BLEEDING: Status: RESOLVED | Noted: 2025-05-24 | Resolved: 2025-05-27

## 2025-05-27 PROBLEM — R79.89 ELEVATED TROPONIN: Status: RESOLVED | Noted: 2022-11-30 | Resolved: 2025-05-27

## 2025-05-27 PROBLEM — K92.0 HEMATEMESIS WITH NAUSEA: Status: RESOLVED | Noted: 2019-02-17 | Resolved: 2025-05-27

## 2025-05-27 PROBLEM — I48.91 ATRIAL FIBRILLATION WITH RVR: Status: RESOLVED | Noted: 2024-04-06 | Resolved: 2025-05-27

## 2025-08-06 PROBLEM — E11.52 DIABETIC WET GANGRENE OF THE FOOT: Status: ACTIVE | Noted: 2025-08-06

## 2025-08-06 PROBLEM — I73.9 PAD (PERIPHERAL ARTERY DISEASE): Status: RESOLVED | Noted: 2025-08-06 | Resolved: 2025-08-06

## 2025-08-06 PROBLEM — R16.0 LIVER MASS, RIGHT LOBE: Status: ACTIVE | Noted: 2025-08-06

## 2025-08-06 PROBLEM — E11.52 DIABETIC WET GANGRENE OF THE FOOT: Status: RESOLVED | Noted: 2025-08-06 | Resolved: 2025-08-06

## 2025-08-06 PROBLEM — I5A MYOCARDIAL INJURY: Status: ACTIVE | Noted: 2025-08-06

## 2025-08-06 PROBLEM — I73.9 PAD (PERIPHERAL ARTERY DISEASE): Status: ACTIVE | Noted: 2025-08-06

## 2025-08-06 PROBLEM — E87.20 LACTIC ACIDOSIS: Status: ACTIVE | Noted: 2025-08-06

## 2025-08-08 PROBLEM — B95.61 MSSA BACTEREMIA: Status: ACTIVE | Noted: 2024-02-14

## 2025-08-15 PROBLEM — E87.5 HYPERKALEMIA: Status: ACTIVE | Noted: 2025-08-15

## 2025-08-15 PROBLEM — K59.00 CONSTIPATION: Status: ACTIVE | Noted: 2025-08-15

## 2025-08-18 PROBLEM — Z75.8 DISCHARGE PLANNING ISSUES: Status: ACTIVE | Noted: 2025-08-18

## 2025-08-19 PROBLEM — Z89.512 S/P BKA (BELOW KNEE AMPUTATION), LEFT: Status: ACTIVE | Noted: 2025-08-19

## 2025-08-20 ENCOUNTER — DOCUMENTATION ONLY (OUTPATIENT)
Dept: HEMATOLOGY/ONCOLOGY | Facility: CLINIC | Age: 65
End: 2025-08-20
Payer: MEDICARE

## 2025-08-25 ENCOUNTER — DOCUMENTATION ONLY (OUTPATIENT)
Dept: HEMATOLOGY/ONCOLOGY | Facility: CLINIC | Age: 65
End: 2025-08-25
Payer: MEDICARE